# Patient Record
Sex: MALE | Race: BLACK OR AFRICAN AMERICAN | URBAN - METROPOLITAN AREA
[De-identification: names, ages, dates, MRNs, and addresses within clinical notes are randomized per-mention and may not be internally consistent; named-entity substitution may affect disease eponyms.]

---

## 2018-11-23 ENCOUNTER — APPOINTMENT (OUTPATIENT)
Dept: CT IMAGING | Age: 63
End: 2018-11-23
Attending: EMERGENCY MEDICINE
Payer: MEDICARE

## 2018-11-23 ENCOUNTER — HOSPITAL ENCOUNTER (EMERGENCY)
Age: 63
Discharge: HOME | End: 2018-11-23
Attending: EMERGENCY MEDICINE
Payer: MEDICARE

## 2018-11-23 VITALS
HEIGHT: 77 IN | HEART RATE: 60 BPM | TEMPERATURE: 98.1 F | BODY MASS INDEX: 27.75 KG/M2 | SYSTOLIC BLOOD PRESSURE: 145 MMHG | WEIGHT: 235 LBS | DIASTOLIC BLOOD PRESSURE: 74 MMHG | RESPIRATION RATE: 13 BRPM | OXYGEN SATURATION: 100 %

## 2018-11-23 DIAGNOSIS — H53.2 DIPLOPIA: ICD-10-CM

## 2018-11-23 DIAGNOSIS — S00.83XA FACIAL CONTUSION, INITIAL ENCOUNTER: Primary | ICD-10-CM

## 2018-11-23 LAB
ALBUMIN SERPL-MCNC: 3.6 G/DL (ref 3.4–5)
ALBUMIN/GLOB SERPL: 0.9 {RATIO} (ref 0.8–1.7)
ALP SERPL-CCNC: 127 U/L (ref 45–117)
ALT SERPL-CCNC: 16 U/L (ref 16–61)
ANION GAP SERPL CALC-SCNC: 5 MMOL/L (ref 3–18)
APTT PPP: 34.1 SEC (ref 23–36.4)
AST SERPL-CCNC: 21 U/L (ref 15–37)
BASOPHILS # BLD: 0 K/UL (ref 0–0.1)
BASOPHILS NFR BLD: 0 % (ref 0–2)
BILIRUB SERPL-MCNC: 0.3 MG/DL (ref 0.2–1)
BUN SERPL-MCNC: 9 MG/DL (ref 7–18)
BUN/CREAT SERPL: 10 (ref 12–20)
CALCIUM SERPL-MCNC: 9.4 MG/DL (ref 8.5–10.1)
CHLORIDE SERPL-SCNC: 106 MMOL/L (ref 100–108)
CO2 SERPL-SCNC: 29 MMOL/L (ref 21–32)
CREAT SERPL-MCNC: 0.93 MG/DL (ref 0.6–1.3)
DIFFERENTIAL METHOD BLD: ABNORMAL
EOSINOPHIL # BLD: 0.1 K/UL (ref 0–0.4)
EOSINOPHIL NFR BLD: 1 % (ref 0–5)
ERYTHROCYTE [DISTWIDTH] IN BLOOD BY AUTOMATED COUNT: 20.6 % (ref 11.6–14.5)
GLOBULIN SER CALC-MCNC: 4.1 G/DL (ref 2–4)
GLUCOSE SERPL-MCNC: 162 MG/DL (ref 74–99)
HCT VFR BLD AUTO: 36.4 % (ref 36–48)
HGB BLD-MCNC: 10.9 G/DL (ref 13–16)
INR PPP: 1 (ref 0.8–1.2)
LYMPHOCYTES # BLD: 2.4 K/UL (ref 0.9–3.6)
LYMPHOCYTES NFR BLD: 32 % (ref 21–52)
MCH RBC QN AUTO: 22.9 PG (ref 24–34)
MCHC RBC AUTO-ENTMCNC: 29.9 G/DL (ref 31–37)
MCV RBC AUTO: 76.3 FL (ref 74–97)
MONOCYTES # BLD: 0.6 K/UL (ref 0.05–1.2)
MONOCYTES NFR BLD: 8 % (ref 3–10)
NEUTS SEG # BLD: 4.4 K/UL (ref 1.8–8)
NEUTS SEG NFR BLD: 59 % (ref 40–73)
PLATELET # BLD AUTO: 438 K/UL (ref 135–420)
PMV BLD AUTO: 9.6 FL (ref 9.2–11.8)
POTASSIUM SERPL-SCNC: 5 MMOL/L (ref 3.5–5.5)
PROT SERPL-MCNC: 7.7 G/DL (ref 6.4–8.2)
PROTHROMBIN TIME: 12.6 SEC (ref 11.5–15.2)
RBC # BLD AUTO: 4.77 M/UL (ref 4.7–5.5)
SODIUM SERPL-SCNC: 140 MMOL/L (ref 136–145)
WBC # BLD AUTO: 7.5 K/UL (ref 4.6–13.2)

## 2018-11-23 PROCEDURE — 85025 COMPLETE CBC W/AUTO DIFF WBC: CPT

## 2018-11-23 PROCEDURE — 80053 COMPREHEN METABOLIC PANEL: CPT

## 2018-11-23 PROCEDURE — 70450 CT HEAD/BRAIN W/O DYE: CPT

## 2018-11-23 PROCEDURE — 93005 ELECTROCARDIOGRAM TRACING: CPT

## 2018-11-23 PROCEDURE — 85730 THROMBOPLASTIN TIME PARTIAL: CPT

## 2018-11-23 PROCEDURE — 85610 PROTHROMBIN TIME: CPT

## 2018-11-23 PROCEDURE — 70486 CT MAXILLOFACIAL W/O DYE: CPT

## 2018-11-23 PROCEDURE — 99285 EMERGENCY DEPT VISIT HI MDM: CPT

## 2018-11-23 NOTE — ED TRIAGE NOTES
Patient states on 11/17 he was in his tub, became dizzy, fell while getting out of the tub, no LOC, states he became \"real whoozy\", states he has had a headache since this fall, states he is still dizzy, double vision after the fall

## 2018-11-23 NOTE — DISCHARGE INSTRUCTIONS
Double Vision: Care Instructions  Your Care Instructions  Double vision means seeing two images instead of one. To see normally, your eyes, the muscles that move them, the nerves that send images to your brain, and your brain all have to work together. A problem with any of these parts can cause double vision. Double vision can occur in one or both eyes. It can be horizontal (so the images appear side by side) or vertical (so one image appears above the other). Double vision may be caused by a muscle or nerve problem. Or it may be caused by an eye problem such as a cataract or by a brain problem such as a stroke. When the cause is found, double vision can usually be corrected. To find the cause, you may need tests. These may include blood tests and imaging tests such as MRI. You may need to follow up with an eye doctor or a brain specialist (neurologist) for more testing or treatment. The doctor has checked you carefully, but problems can develop later. If you notice any problems or new symptoms, get medical treatment right away. Follow-up care is a key part of your treatment and safety. Be sure to make and go to all appointments, and call your doctor if you are having problems. It's also a good idea to know your test results and keep a list of the medicines you take. How can you care for yourself at home? · Do not drive or do other things that could be dangerous because of your double vision. · Make your home safe to help prevent injuries. For example, remove throw rugs and electrical cords that could cause falls. Be extra careful when you work with sharp tools or knives. · Ask another adult to stay with you until your vision gets better. When should you call for help? Call 911 anytime you think you may need emergency care. For example, call if:  · You have symptoms of a stroke.  These may include:  ¨ Sudden numbness, tingling, weakness, or loss of movement in your face, arm, or leg, especially on only one side of your body. ¨ Sudden vision changes. ¨ Sudden trouble speaking. ¨ Sudden confusion or trouble understanding simple statements. ¨ Sudden problems with walking or balance. ¨ A sudden, severe headache that is different from past headaches. Call your doctor now or seek immediate medical care if:  · You have new or worse eye pain. · You have new or worse redness in your eye. · Light hurts your eye. Watch closely for changes in your health, and be sure to contact your doctor if:  · You do not get better as expected. Where can you learn more? Go to DoubleUp.be  Enter S401 in the search box to learn more about \"Double Vision: Care Instructions. \"   © 9239-4101 Healthwise, [employer]. Care instructions adapted under license by [employer] (which disclaims liability or warranty for this information). This care instruction is for use with your licensed healthcare professional. If you have questions about a medical condition or this instruction, always ask your healthcare professional. [place] [address] any warranty or liability for your use of this information. Content Version: 80.6.209700; Current as of: August 21, 2015             Head Injury: Care Instructions  Your Care Instructions    Most injuries to the head are minor. Bumps, cuts, and scrapes on the head and face usually heal well and can be treated the same as injuries to other parts of the body. Although it's rare, once in a while a more serious problem shows up after you are home. So it's good to be on the lookout for symptoms for a day or two. Follow-up care is a key part of your treatment and safety. Be sure to make and go to all appointments, and call your doctor if you are having problems. It's also a good idea to know your test results and keep a list of the medicines you take. How can you care for yourself at home? · Follow your doctor's instructions.  He or she will tell you if you need someone to watch you closely for the next 24 hours or longer. · Take it easy for the next few days or more if you are not feeling well. · Ask your doctor when it's okay for you to go back to activities like driving a car, riding a bike, or operating machinery. When should you call for help? Call 911 anytime you think you may need emergency care. For example, call if:    · You have a seizure.     · You passed out (lost consciousness).     · You are confused or can't stay awake.    Call your doctor now or seek immediate medical care if:    · You have new or worse vomiting.     · You feel less alert.     · You have new weakness or numbness in any part of your body.    Watch closely for changes in your health, and be sure to contact your doctor if:    · You do not get better as expected.     · You have new symptoms, such as headaches, trouble concentrating, or changes in mood. Where can you learn more? Go to http://loyd-kalee.info/. Enter F615 in the search box to learn more about \"Head Injury: Care Instructions. \"  Current as of: June 4, 2018  Content Version: 11.8  © 5182-7902 Healthwise, Inc[employer]. Care instructions adapted under license by [employer] (which disclaims liability or warranty for this information). If you have questions about a medical condition or this instruction, always ask your healthcare professional. [address] any warranty or liability for your use of this information.

## 2018-11-23 NOTE — ED PROVIDER NOTES
EMERGENCY DEPARTMENT HISTORY AND PHYSICAL EXAM 
 
2:02 PM 
 
 
Date: 11/23/2018 Patient Name: Simi Tucker History of Presenting Illness Chief Complaint Patient presents with  Fall  Headache  Hypertension  Double Vision History Provided By: Patient Chief Complaint: Headache Duration:  Today Timing:  Acute Location: Right-sided head Quality: N/A Severity: 5/10 Modifying Factors: Reports recent fall on 11/17/18 Associated Symptoms: Also reports dizziness, and double vision. Denies other associated symptoms. Additional History (Context): Simi Tucker is a 66 y.o. male presents withacute right-sided headache onset today. Patient states on 11/17, while in a standing position in the tub, he experienced dizziness, and fell while getting out of the tub. Reports hitting his head and right eye. Denies LOC. Reports waking up today with a right-sided headache, double vision, and dizziness. Denies other associated symptoms. Reports he was recommended by his home health aide to come to the ED for an evaluation. Also reports concern for his HTN. Notes hx of HTN and DM. Reports compliance with his medications for DM. Notes his sugars have been running \"so-so. \" No other concerns were expressed at this time. PCP: Sandra Doshi MD 
 
 
 
Past History Past Medical History: 
Past Medical History:  
Diagnosis Date  Cancer [facility])   
 prostate  Diabetes ([facility] [address]  Hypertension  Ill-defined condition   
 back and neck pain Past Surgical History: 
Past Surgical History:  
Procedure Laterality Date  HX ORTHOPAEDIC    
 back, knees Family History: 
History reviewed. No pertinent family history. Social History: 
Social History Tobacco Use  Smoking status: Current Every Day Smoker Packs/day: 1.00  Smokeless tobacco: Never Used Substance Use Topics  Alcohol use: No  
  Frequency: Never  Drug use: No  
 
 
Allergies: No Known Allergies Review of Systems Review of Systems Constitutional: Negative for diaphoresis and fever. HENT: Negative for congestion and sore throat. Eyes: Positive for visual disturbance (Double vision ). Negative for pain and itching. Respiratory: Negative for cough and shortness of breath. Cardiovascular: Negative for chest pain and palpitations. Gastrointestinal: Negative for abdominal pain and diarrhea. Endocrine: Negative for polydipsia and polyuria. Genitourinary: Negative for dysuria and hematuria. Musculoskeletal: Negative for arthralgias and myalgias. Skin: Negative for rash and wound. Neurological: Positive for dizziness and headaches. Negative for seizures and syncope. Hematological: Does not bruise/bleed easily. Psychiatric/Behavioral: Negative for agitation and hallucinations. Physical Exam  
 
Patient Vitals for the past 12 hrs: 
 Temp Pulse Resp BP SpO2  
11/23/18 1234 98.1 °F (36.7 °C) 65 16 154/83 100 % Physical Exam  
Constitutional: He appears well-developed and well-nourished. HENT:  
Head: Normocephalic and atraumatic. Eyes: Conjunctivae are normal. No scleral icterus. Neck: Normal range of motion. Neck supple. No JVD present. Cardiovascular: Normal rate, regular rhythm and normal heart sounds. 4 intact extremity pulses Pulmonary/Chest: Effort normal and breath sounds normal.  
Abdominal: Soft. He exhibits no mass. There is no tenderness. Musculoskeletal: Normal range of motion. Lymphadenopathy:  
  He has no cervical adenopathy. Neurological: He is alert. He has normal strength. No cranial nerve deficit or sensory deficit. Coordination normal.  
Skin: Skin is warm and dry. Nursing note and vitals reviewed. Diagnostic Study Results Labs - Recent Results (from the past 12 hour(s)) CBC WITH AUTOMATED DIFF Collection Time: 11/23/18  1:30 PM  
Result Value Ref Range WBC 7.5 4.6 - 13.2 K/uL RBC 4.77 4.70 - 5.50 M/uL  
 HGB 10.9 (L) 13.0 - 16.0 g/dL HCT 36.4 36.0 - 48.0 % MCV 76.3 74.0 - 97.0 FL  
 MCH 22.9 (L) 24.0 - 34.0 PG  
 MCHC 29.9 (L) 31.0 - 37.0 g/dL RDW 20.6 (H) 11.6 - 14.5 % PLATELET 471 (H) 329 - 420 K/uL MPV 9.6 9.2 - 11.8 FL  
 NEUTROPHILS 59 40 - 73 % LYMPHOCYTES 32 21 - 52 % MONOCYTES 8 3 - 10 % EOSINOPHILS 1 0 - 5 % BASOPHILS 0 0 - 2 %  
 ABS. NEUTROPHILS 4.4 1.8 - 8.0 K/UL  
 ABS. LYMPHOCYTES 2.4 0.9 - 3.6 K/UL  
 ABS. MONOCYTES 0.6 0.05 - 1.2 K/UL  
 ABS. EOSINOPHILS 0.1 0.0 - 0.4 K/UL  
 ABS. BASOPHILS 0.0 0.0 - 0.1 K/UL  
 DF AUTOMATED METABOLIC PANEL, COMPREHENSIVE Collection Time: 11/23/18  1:30 PM  
Result Value Ref Range Sodium 140 136 - 145 mmol/L Potassium 5.0 3.5 - 5.5 mmol/L Chloride 106 100 - 108 mmol/L  
 CO2 29 21 - 32 mmol/L Anion gap 5 3.0 - 18 mmol/L Glucose 162 (H) 74 - 99 mg/dL BUN 9 7.0 - 18 MG/DL Creatinine 0.93 0.6 - 1.3 MG/DL  
 BUN/Creatinine ratio 10 (L) 12 - 20 GFR est AA >60 >60 ml/min/1.73m2 GFR est non-AA >60 >60 ml/min/1.73m2 Calcium 9.4 8.5 - 10.1 MG/DL Bilirubin, total 0.3 0.2 - 1.0 MG/DL  
 ALT (SGPT) 16 16 - 61 U/L  
 AST (SGOT) 21 15 - 37 U/L Alk. phosphatase 127 (H) 45 - 117 U/L Protein, total 7.7 6.4 - 8.2 g/dL Albumin 3.6 3.4 - 5.0 g/dL Globulin 4.1 (H) 2.0 - 4.0 g/dL A-G Ratio 0.9 0.8 - 1.7 PROTHROMBIN TIME + INR Collection Time: 11/23/18  1:30 PM  
Result Value Ref Range Prothrombin time 12.6 11.5 - 15.2 sec INR 1.0 0.8 - 1.2 PTT Collection Time: 11/23/18  1:30 PM  
Result Value Ref Range aPTT 34.1 23.0 - 36.4 SEC  
EKG, 12 LEAD, INITIAL Collection Time: 11/23/18  2:55 PM  
Result Value Ref Range Ventricular Rate 55 BPM  
 Atrial Rate 55 BPM  
 P-R Interval 152 ms QRS Duration 88 ms Q-T Interval 446 ms  
 QTC Calculation (Bezet) 426 ms Calculated P Axis 40 degrees Calculated R Axis -18 degrees Calculated T Axis -117 degrees Diagnosis Sinus bradycardia Left ventricular hypertrophy with repolarization abnormality Abnormal ECG When compared with ECG of 05-NOV-2011 00:21, 
Vent. rate has decreased BY  95 BPM 
ST now depressed in Inferior leads Non-specific change in ST segment in Anterior leads T wave inversion now evident in Inferior leads T wave inversion now evident in Anterior leads Radiologic Studies -  
CT MAXILLOFACIAL WO CONT Final Result CT HEAD WO CONT Final Result Ct Head Wo Cont Result Date: 11/23/2018 EXAM: CT HEAD, WITHOUT IV CONTRAST COMPARISON: None INDICATION: Prior fall in bathtub 11/17/2018 with persistent dizziness and double vision TECHNIQUE: Multiple axial CT images of the head were obtained extending from the skull base through the vertex. Additional coronal and sagittal reformations were also performed. One or more dose reduction techniques were used on this CT: automated exposure control, adjustment of the mAs and/or kVp according to patient size, and iterative reconstruction techniques. The specific techniques used on this CT exam have been documented in the patient's electronic medical record. _______________ FINDINGS: BRAIN AND POSTERIOR FOSSA: There is generalized prominence of the ventricular system, associated with proportional widening of the cortical cerebral sulci, compatible with generalized volume loss. Hazy hypoattenuation identified along the periventricular white matter, a nonspecific finding which is most commonly encountered in the setting of chronic microvascular disease. There is no intracranial hemorrhage, mass effect, or midline shift. There are no significant additional areas of abnormal parenchymal attenuation. EXTRA-AXIAL SPACES AND MENINGES: There are no abnormal extra-axial fluid collections. CALVARIUM: No acute osseous abnormality.  OTHER: The visualized portions of the paranasal sinuses and mastoid air cells are clear. _______________ IMPRESSION: 1. No CT evidence of an acute intracranial abnormality or other findings related to recent trauma. 2.  Mild cerebral atrophy/volume loss and periventricular white matter changes, most commonly seen with chronic microvascular disease. Ct Maxillofacial Wo Cont Result Date: 11/23/2018 EXAM: CT MAXILLOFACIAL WO CONT CLINICAL INDICATION/HISTORY: Facial fracture(s). -Additional: None COMPARISON: None. TECHNIQUE: Axial CT imaging of the maxillofacial bones was performed without intravenous contrast. Sagittal and coronal reconstructions are provided. One or more dose reduction techniques were used on this CT: automated exposure control, adjustment of the mAs and/or kVp according to patient size, and iterative reconstruction techniques. The specific techniques used on this CT exam have been documented in the patient's electronic medical record. _______________ FINDINGS: Facial bones: No acute facial bone fractures. There is mild expansion with groundglass and internal lucency in the left frontal bone along the superior lateral orbital rim, compatible with fibrous dysplasia. There is a a second smaller focus in the right frontal sinus. Scattered streak artifact from metallic dental work obscures the oral cavity. Normal alignment of the temporomandibular joints. Calvarium: Intact. Soft tissues: Unremarkable. Sinuses: There are several small polypoid lesions in the maxillary sinuses bilaterally, likely mucus retention cysts. Mild mucosal thickening in the right sphenoid sinus. Sinuses are otherwise clear. Included brain parenchyma: Unremarkable. Other: Severe degenerative changes in the cervical spine are partially visualized including solid fusion across C4-C5, C5-C6. _______________ IMPRESSION: 1. No acute facial bone fractures.  2. Benign appearing osseous lesion in the left frontal bone and right frontal sinus, most compatible with fibrous dysplasia. 3. Mild paranasal sinus disease. Medications ordered:  
Medications - No data to display Medical Decision Making Initial Medical Decision Making and DDx: 
Double vision is concerning for an intracranial lesion. ED Course: Progress Notes, Reevaluation, and Consults: 
  
2:59 PM 
I spoke with Dr. Blaze Gee, Teleneurology. Will come and evaluate the patient. 3:09 PM 
I spoke with Dr. Blaze Gee (Teleneurology). He reports possible traumatic contusion of the nerve, causing his diplopia. Not consistent with a stroke. Recommends the patient follow-up with Ophthalmology. I am the first provider for this patient. I reviewed the vital signs, available nursing notes, past medical history, past surgical history, family history and social history. Vital Signs-Reviewed the patient's vital signs. Pulse Oximetry Analysis - 100% on Room Air Cardiac Monitor: 
Rate/Rhythm: 65bpm/ Normal Sinus Rhythm EKG: Interpreted by the EP. Time Interpreted: 2:55PM 
 Rate: 55bpm 
 Rhythm: Sinus Bradycardia Interpretation: Deep T-wave inversions. Patient stated he has a hx of an abnormal EKG and has no chest pain. Records Reviewed: Nursing Notes and Triage notes  (Time of Review: 2:02 PM) 
 
3:27 PM Pt reevaluated at this time. Discussed results and findings, as well as, diagnosis and plan for discharge. Follow up with doctors/services listed. Return to the emergency department for alarming symptoms. Pt verbalizes understanding and agreement with plan. All questions addressed. Diagnosis Clinical Impression: 1. Facial contusion, initial encounter 2. Diplopia Disposition: Discharged Follow-up Information Follow up With Specialties Details Why Contact Info Miya Jaeger., MD Ophthalmology In 3 days  [address] [id number] [phone] Medication List  
  
 You have not been prescribed any medications. _______________________________ Attestations: 
Scribe Attestation Oc Alicea acting as a scribe for and in the presence of Wilmer Morelos MD     
November 23, 2018 at 2:02 PM 
    
Provider Attestation:     
I personally performed the services described in the documentation, reviewed the documentation, as recorded by the scribe in my presence, and it accurately and completely records my words and actions. November 23, 2018 at 2:02 PM - Wilmer Morelos MD   
_______________________________

## 2018-11-26 LAB
ATRIAL RATE: 55 BPM
CALCULATED P AXIS, ECG09: 40 DEGREES
CALCULATED R AXIS, ECG10: -18 DEGREES
CALCULATED T AXIS, ECG11: -117 DEGREES
DIAGNOSIS, 93000: NORMAL
P-R INTERVAL, ECG05: 152 MS
Q-T INTERVAL, ECG07: 446 MS
QRS DURATION, ECG06: 88 MS
QTC CALCULATION (BEZET), ECG08: 426 MS
VENTRICULAR RATE, ECG03: 55 BPM

## 2020-02-11 ENCOUNTER — OFFICE VISIT (OUTPATIENT)
Dept: FAMILY MEDICINE CLINIC | Facility: CLINIC | Age: 65
End: 2020-02-11

## 2020-02-11 VITALS
BODY MASS INDEX: 25.39 KG/M2 | SYSTOLIC BLOOD PRESSURE: 138 MMHG | HEART RATE: 82 BPM | OXYGEN SATURATION: 100 % | HEIGHT: 77 IN | TEMPERATURE: 95.9 F | WEIGHT: 215 LBS | DIASTOLIC BLOOD PRESSURE: 70 MMHG | RESPIRATION RATE: 16 BRPM

## 2020-02-11 DIAGNOSIS — Z11.59 NEED FOR HEPATITIS C SCREENING TEST: ICD-10-CM

## 2020-02-11 DIAGNOSIS — D50.9 MICROCYTIC ANEMIA: ICD-10-CM

## 2020-02-11 DIAGNOSIS — Z23 ENCOUNTER FOR IMMUNIZATION: ICD-10-CM

## 2020-02-11 DIAGNOSIS — Z12.5 SCREENING FOR PROSTATE CANCER: ICD-10-CM

## 2020-02-11 DIAGNOSIS — F17.210 CIGARETTE NICOTINE DEPENDENCE WITHOUT COMPLICATION: Primary | ICD-10-CM

## 2020-02-11 DIAGNOSIS — Z13.31 SCREENING FOR DEPRESSION: ICD-10-CM

## 2020-02-11 DIAGNOSIS — E66.3 OVERWEIGHT (BMI 25.0-29.9): ICD-10-CM

## 2020-02-11 NOTE — PROGRESS NOTES
Derrick Sharma is a 63 y.o.  male presents today for office visit for establish care. Pt would also like to discuss DM. Pt is not fasting. Pt is in Room # 3      1. Have you been to the ER, urgent care clinic since your last visit? Hospitalized since your last visit? No    2. Have you seen or consulted any other health care providers outside of the [address] [place] [address] since your last visit? Include any pap smears or colon screening. No    Upcoming Appts  none    Health Maintenance reviewed       VORB: No orders of the defined types were placed in this encounter.   Nicolette Trimble LPN

## 2020-02-11 NOTE — PROGRESS NOTES
History and Physical    Today's Date:  2020   Patient's Name: Ovidio Carter   Patient's :  1955     History:     Chief Complaint   Patient presents with    [facility]    Diabetes     BS:    Nicotine Dependence     Ovidio Carter is a 63 y.o. male presenting for initial visit to [facility]. Will obtain records from previous provider to review. Care team updated on connect care. Smoker   This is a chronic problem, new to me. This is not at goal. Pt smokes 1/2 ppd. Pt is open to quitting. Overweight  This is a chronic problem, new to me. This is not at goal.  Pt tries to eat a healthy diet. Pt lost weight since the last weight in his second Epic chart. Diabetes mellitus  This is a chronic problem, new to me. Current status is unknown. Pt is on metformin. Pt is compliant with this medication. 3 most recent PHQ Screens 2020   Little interest or pleasure in doing things Not at all   Feeling down, depressed, irritable, or hopeless Not at all   Total Score PHQ 2 0      Past Medical History:   Diagnosis Date    Cigarette nicotine dependence without complication 8029    Diabetes ([address]     History of prostate cancer     Overweight (BMI 25.0-29.9) 2020     Past Surgical History:   Procedure Laterality Date    HX HAMMER TOE REPAIR      HX PROSTATE SURGERY        reports that he has been smoking. He has never used smokeless tobacco. He reports previous alcohol use. He reports that he does not use drugs. History reviewed. No pertinent family history. No Known Allergies  Problem List:      Patient Active Problem List   Diagnosis Code    Cigarette nicotine dependence without complication S77.369    Overweight (BMI 25.0-29. 9) E66.3     Medications:     Current Outpatient Medications   Medication Sig    pneumococcal 23-valent (PNEUMOVAX 23) 25 mcg/0.5 mL injection 0.5 mL by IntraMUSCular route once for 1 dose.     varicella-zoster recombinant, PF, (SHINGRIX, PF,) 50 mcg/0.5 mL susr injection 0.5 mL by IntraMUSCular route once for 1 dose. No current facility-administered medications for this visit. Review of Systems:   General:   fevers, chills  Neurologic: dizziness, lightheadedness  Eyes:  vision changes, double vision, photophobia  Ears:  change in hearing, ear pain, ear discharge, ear ringing  Nose:  sneezing, runny nose  Mouth/Throat: sore throat, voice change  Neck:  pain, +stiffness  Respiratory: dyspnea at rest, dyspnea on exertion, wheezing, cough, sputum production  Cardiovascular:   chest pain, palpitations  Gastrointestinal:  nausea, vomiting  Urinary: dysuria, urinary frequency, nocturia, malodorous urine, difficulty initiating flow, slow urine stream  Genital (M): penile discharge, ulcerations, rashes  Musculoskeletal:  joint pain, +back pain  Psychiatric: +insomnia, anxiety  Endocrine: cold intolerance, heat intolerance  Hematologic: easy bruising, easy bleeding  Dermatologic: Itching, rash    Physical Assessment:     Visit Vitals  /70 (BP 1 Location: Left arm, BP Patient Position: Sitting) Comment (BP 1 Location): manual   Pulse 82   Temp 95.9 °F (35.5 °C) (Oral)   Resp 16   Ht 6' 5\" (1.956 m)   Wt 215 lb (97.5 kg)   SpO2 100%   BMI 25.50 kg/m²     General:   Well-groomed, well-nourished, in no distress, pleasant, appropriate and conversant. Eyes:    PERRL, conjunctiva clear  Mouth:  MMM, good dentition, oropharynx WNL without membranes, exudates, petechiae or ulcers  Cardiovascular:   RRR, no MRG. Pulmonary:   Lungs clear bilaterally. Normal respiratory effort. Abdomen:   Abdomen soft, NT, ND  Extremities:   No edema, LEs warm and well-perfused. Neuro:   Alert and oriented, no focal deficits. No facial asymmetry noted. Skin:    No rash or jaundice  MSK:   Normal ROM, 5/5 muscle strength  Psych:  No pressured speech or abnormal thought content    Assessment/Plan & Orders:         ICD-10-CM ICD-9-CM    1.  Cigarette nicotine dependence without complication V38.438 577.4    2. Overweight (BMI 25.0-29. 9) E66.3 278.02 CBC WITH AUTOMATED DIFF      LIPID PANEL      METABOLIC PANEL, COMPREHENSIVE      HEMOGLOBIN A1C WITH EAG      TSH AND FREE T4   3. Screening for prostate cancer Z12.5 V76.44 PSA, DIAGNOSTIC (PROSTATE SPECIFIC AG)   4. Screening for depression Z13.31 V79.0 NE PATIENT SCREENED FOR DEPRESSION   5. Encounter for immunization Z23 V03.89 pneumococcal 23-valent (PNEUMOVAX 23) 25 mcg/0.5 mL injection      varicella-zoster recombinant, PF, (SHINGRIX, PF,) 50 mcg/0.5 mL susr injection      INFLUENZA VACCINE INACTIVATED (IIV), SUBUNIT, ADJUVANTED, IM   6. Need for hepatitis C screening test Z11.59 V73.89 HEPATITIS C AB     HM  Colon cancer: Colonoscopy done, did at the [place]  Influenza vaccine: due  Pneumococcal vaccine: due, indication is smoking  Tdap: complete  Herpes Zoster vaccine: due at age 61  Hep B vaccine: not indicated (liver dz, DM 19-59)  Weight:  Body mass index is 25.5 kg/m². Discussed the patient's BMI with him. The BMI follow up plan is as follows: diet and exercise  AAA:  One-time abdominal US if current or former smoker aged 72 to 76 years   Osteoporosis:  No indication for dexa scan       Follow-up and Dispositions    · Return in about 1 day (around 2/12/2020) for Nurse visit. *Patient verbalized understanding and agreement with the plan. Patient was given an after-visit summary. Nava Smith.  [address], MD - Internal Medicine  2/11/2020, 8:57 AM  [facility]  [address] [place], [address]  Phone [phone]  Fax [phone]

## 2020-02-11 NOTE — PROGRESS NOTES
Danilo Dixon is a 63 y.o. male who presents for routine immunizations. He denies any symptoms , reactions or allergies that would exclude them from being immunized today. Risks and adverse reactions were discussed and the VIS was given to them. All questions were addressed. He was observed for 10 min post injection. There were no reactions observed.     Domo Mann LPN

## 2020-02-12 ENCOUNTER — HOSPITAL ENCOUNTER (OUTPATIENT)
Dept: LAB | Age: 65
Discharge: HOME | End: 2020-02-12
Payer: MEDICARE

## 2020-02-12 DIAGNOSIS — E66.3 OVERWEIGHT (BMI 25.0-29.9): ICD-10-CM

## 2020-02-12 DIAGNOSIS — Z11.59 NEED FOR HEPATITIS C SCREENING TEST: ICD-10-CM

## 2020-02-12 DIAGNOSIS — Z12.5 SCREENING FOR PROSTATE CANCER: ICD-10-CM

## 2020-02-12 LAB
ALBUMIN SERPL-MCNC: 3.7 G/DL (ref 3.4–5)
ALBUMIN/GLOB SERPL: 0.9 {RATIO} (ref 0.8–1.7)
ALP SERPL-CCNC: 115 U/L (ref 45–117)
ALT SERPL-CCNC: 11 U/L (ref 16–61)
ANION GAP SERPL CALC-SCNC: 4 MMOL/L (ref 3–18)
AST SERPL-CCNC: 10 U/L (ref 10–38)
BASOPHILS # BLD: 0 K/UL (ref 0–0.1)
BASOPHILS NFR BLD: 0 % (ref 0–2)
BILIRUB SERPL-MCNC: 0.4 MG/DL (ref 0.2–1)
BUN SERPL-MCNC: 18 MG/DL (ref 7–18)
BUN/CREAT SERPL: 20 (ref 12–20)
CALCIUM SERPL-MCNC: 9.4 MG/DL (ref 8.5–10.1)
CHLORIDE SERPL-SCNC: 107 MMOL/L (ref 100–111)
CHOLEST SERPL-MCNC: 145 MG/DL
CO2 SERPL-SCNC: 29 MMOL/L (ref 21–32)
CREAT SERPL-MCNC: 0.91 MG/DL (ref 0.6–1.3)
DIFFERENTIAL METHOD BLD: ABNORMAL
EOSINOPHIL # BLD: 0.3 K/UL (ref 0–0.4)
EOSINOPHIL NFR BLD: 3 % (ref 0–5)
ERYTHROCYTE [DISTWIDTH] IN BLOOD BY AUTOMATED COUNT: 20.9 % (ref 11.6–14.5)
EST. AVERAGE GLUCOSE BLD GHB EST-MCNC: 128 MG/DL
GLOBULIN SER CALC-MCNC: 3.9 G/DL (ref 2–4)
GLUCOSE SERPL-MCNC: 114 MG/DL (ref 74–99)
HBA1C MFR BLD: 6.1 % (ref 4.2–5.6)
HCT VFR BLD AUTO: 30.7 % (ref 36–48)
HDLC SERPL-MCNC: 52 MG/DL (ref 40–60)
HDLC SERPL: 2.8 {RATIO} (ref 0–5)
HGB BLD-MCNC: 9.1 G/DL (ref 13–16)
LDLC SERPL CALC-MCNC: 78.8 MG/DL (ref 0–100)
LIPID PROFILE,FLP: NORMAL
LYMPHOCYTES # BLD: 2.3 K/UL (ref 0.9–3.6)
LYMPHOCYTES NFR BLD: 28 % (ref 21–52)
MCH RBC QN AUTO: 20.4 PG (ref 24–34)
MCHC RBC AUTO-ENTMCNC: 29.6 G/DL (ref 31–37)
MCV RBC AUTO: 68.7 FL (ref 74–97)
MONOCYTES # BLD: 0.9 K/UL (ref 0.05–1.2)
MONOCYTES NFR BLD: 11 % (ref 3–10)
NEUTS SEG # BLD: 4.8 K/UL (ref 1.8–8)
NEUTS SEG NFR BLD: 58 % (ref 40–73)
PLATELET # BLD AUTO: 458 K/UL (ref 135–420)
PLATELET COMMENTS,PCOM: ABNORMAL
PMV BLD AUTO: 10.5 FL (ref 9.2–11.8)
POTASSIUM SERPL-SCNC: 4.3 MMOL/L (ref 3.5–5.5)
PROT SERPL-MCNC: 7.6 G/DL (ref 6.4–8.2)
PSA SERPL-MCNC: 0 NG/ML (ref 0–4)
RBC # BLD AUTO: 4.47 M/UL (ref 4.7–5.5)
RBC MORPH BLD: ABNORMAL
SODIUM SERPL-SCNC: 140 MMOL/L (ref 136–145)
T4 FREE SERPL-MCNC: 1.1 NG/DL (ref 0.7–1.5)
TRIGL SERPL-MCNC: 71 MG/DL (ref ?–150)
TSH SERPL DL<=0.05 MIU/L-ACNC: 1.33 UIU/ML (ref 0.36–3.74)
VLDLC SERPL CALC-MCNC: 14.2 MG/DL
WBC # BLD AUTO: 8.3 K/UL (ref 4.6–13.2)

## 2020-02-12 PROCEDURE — 36415 COLL VENOUS BLD VENIPUNCTURE: CPT

## 2020-02-12 PROCEDURE — 84439 ASSAY OF FREE THYROXINE: CPT

## 2020-02-12 PROCEDURE — 80061 LIPID PANEL: CPT

## 2020-02-12 PROCEDURE — 82607 VITAMIN B-12: CPT

## 2020-02-12 PROCEDURE — 83540 ASSAY OF IRON: CPT

## 2020-02-12 PROCEDURE — 85025 COMPLETE CBC W/AUTO DIFF WBC: CPT

## 2020-02-12 PROCEDURE — 80053 COMPREHEN METABOLIC PANEL: CPT

## 2020-02-12 PROCEDURE — 83036 HEMOGLOBIN GLYCOSYLATED A1C: CPT

## 2020-02-12 PROCEDURE — 84153 ASSAY OF PSA TOTAL: CPT

## 2020-02-12 PROCEDURE — 86803 HEPATITIS C AB TEST: CPT

## 2020-02-12 RX ORDER — METOPROLOL TARTRATE 50 MG/1
TABLET ORAL 2 TIMES DAILY
COMMUNITY

## 2020-02-12 RX ORDER — ATORVASTATIN CALCIUM 80 MG/1
40 TABLET, FILM COATED ORAL DAILY
COMMUNITY

## 2020-02-12 RX ORDER — SIMVASTATIN 80 MG/1
80 TABLET, FILM COATED ORAL
COMMUNITY

## 2020-02-12 RX ORDER — SENNOSIDES 25 MG/1
TABLET, FILM COATED ORAL
COMMUNITY

## 2020-02-12 RX ORDER — DOCUSATE SODIUM 100 MG/1
100 CAPSULE, LIQUID FILLED ORAL 2 TIMES DAILY
COMMUNITY

## 2020-02-12 RX ORDER — LISINOPRIL 20 MG/1
TABLET ORAL DAILY
COMMUNITY

## 2020-02-12 RX ORDER — BUPROPION HYDROCHLORIDE 150 MG/1
TABLET, EXTENDED RELEASE ORAL 3 TIMES DAILY
COMMUNITY

## 2020-02-12 RX ORDER — METFORMIN HYDROCHLORIDE 1000 MG/1
1000 TABLET ORAL DAILY
COMMUNITY

## 2020-02-12 RX ORDER — TRAMADOL HYDROCHLORIDE 50 MG/1
50 TABLET ORAL 3 TIMES DAILY
COMMUNITY

## 2020-02-12 RX ORDER — DILTIAZEM HYDROCHLORIDE EXTENDED-RELEASE TABLETS 240 MG/1
240 TABLET, EXTENDED RELEASE ORAL DAILY
COMMUNITY

## 2020-02-12 RX ORDER — ACYCLOVIR 200 MG/1
CAPSULE ORAL DAILY
COMMUNITY

## 2020-02-13 PROBLEM — D50.9 MICROCYTIC ANEMIA: Status: ACTIVE | Noted: 2020-02-13

## 2020-02-13 PROBLEM — R73.09 ELEVATED HEMOGLOBIN A1C: Status: ACTIVE | Noted: 2020-02-13

## 2020-02-13 LAB
HCV AB SER IA-ACNC: 0.07 INDEX
HCV AB SERPL QL IA: NEGATIVE
HCV COMMENT,HCGAC: NORMAL

## 2020-02-13 NOTE — PROGRESS NOTES
Result reviewed. LPN to call pt with results. Microcytic anemia noted. Additional labs ordered. LPN to find out if lab can add to previously drawn labs.  Pt also needs to also do a fit test. Recommend pt make a follow up appt for 1 month

## 2020-02-17 LAB
FOLATE SERPL-MCNC: 7.4 NG/ML (ref 3.1–17.5)
IRON SATN MFR SERPL: 5 % (ref 20–50)
IRON SERPL-MCNC: 18 UG/DL (ref 50–175)
TIBC SERPL-MCNC: 391 UG/DL (ref 250–450)
VIT B12 SERPL-MCNC: 515 PG/ML (ref 211–911)

## 2020-03-09 NOTE — PROGRESS NOTES
Result reviewed. LPN to call pt with results. Pt has iron deficiency anemia. Recommend a fit test or referral for a colonoscopy.

## 2020-11-25 ENCOUNTER — TELEPHONE (OUTPATIENT)
Dept: FAMILY MEDICINE CLINIC | Age: 65
End: 2020-11-25

## 2020-11-25 DIAGNOSIS — D50.9 IRON DEFICIENCY ANEMIA, UNSPECIFIED IRON DEFICIENCY ANEMIA TYPE: ICD-10-CM

## 2020-11-25 DIAGNOSIS — R73.09 ELEVATED HEMOGLOBIN A1C: Primary | ICD-10-CM

## 2020-11-25 NOTE — TELEPHONE ENCOUNTER
Please lab order.     Future Appointments   Date Time Provider Navi Serrato   12/10/2020  1:00 PM Servando Harrison MD [facility] BS AMB

## 2020-12-10 ENCOUNTER — VIRTUAL VISIT (OUTPATIENT)
Dept: FAMILY MEDICINE CLINIC | Age: 65
End: 2020-12-10
Payer: MEDICARE

## 2020-12-10 DIAGNOSIS — E66.3 OVERWEIGHT (BMI 25.0-29.9): ICD-10-CM

## 2020-12-10 DIAGNOSIS — Z71.89 ADVANCED CARE PLANNING/COUNSELING DISCUSSION: ICD-10-CM

## 2020-12-10 DIAGNOSIS — F17.210 CIGARETTE NICOTINE DEPENDENCE WITHOUT COMPLICATION: ICD-10-CM

## 2020-12-10 DIAGNOSIS — I10 ESSENTIAL HYPERTENSION: ICD-10-CM

## 2020-12-10 DIAGNOSIS — E11.9 CONTROLLED TYPE 2 DIABETES MELLITUS WITHOUT COMPLICATION, WITHOUT LONG-TERM CURRENT USE OF INSULIN (HCC): ICD-10-CM

## 2020-12-10 DIAGNOSIS — Z00.00 MEDICARE ANNUAL WELLNESS VISIT, INITIAL: Primary | ICD-10-CM

## 2020-12-10 PROBLEM — R73.09 ELEVATED HEMOGLOBIN A1C: Status: RESOLVED | Noted: 2020-02-13 | Resolved: 2020-12-10

## 2020-12-10 PROCEDURE — G8417 CALC BMI ABV UP PARAM F/U: HCPCS | Performed by: INTERNAL MEDICINE

## 2020-12-10 PROCEDURE — 99214 OFFICE O/P EST MOD 30 MIN: CPT | Performed by: INTERNAL MEDICINE

## 2020-12-10 PROCEDURE — 3044F HG A1C LEVEL LT 7.0%: CPT | Performed by: INTERNAL MEDICINE

## 2020-12-10 PROCEDURE — 99497 ADVNCD CARE PLAN 30 MIN: CPT | Performed by: INTERNAL MEDICINE

## 2020-12-10 PROCEDURE — G8427 DOCREV CUR MEDS BY ELIG CLIN: HCPCS | Performed by: INTERNAL MEDICINE

## 2020-12-10 PROCEDURE — 99406 BEHAV CHNG SMOKING 3-10 MIN: CPT | Performed by: INTERNAL MEDICINE

## 2020-12-10 PROCEDURE — 3017F COLORECTAL CA SCREEN DOC REV: CPT | Performed by: INTERNAL MEDICINE

## 2020-12-10 PROCEDURE — 2022F DILAT RTA XM EVC RTNOPTHY: CPT | Performed by: INTERNAL MEDICINE

## 2020-12-10 PROCEDURE — G8432 DEP SCR NOT DOC, RNG: HCPCS | Performed by: INTERNAL MEDICINE

## 2020-12-10 PROCEDURE — G0438 PPPS, INITIAL VISIT: HCPCS | Performed by: INTERNAL MEDICINE

## 2020-12-10 RX ORDER — ACYCLOVIR 200 MG/1
CAPSULE ORAL DAILY
Status: CANCELLED | OUTPATIENT
Start: 2020-12-10

## 2020-12-10 NOTE — PROGRESS NOTES
Humphrey Fontenot is a 63 y.o. male and presents for annual Medicare Wellness Visit. Problem List: Reviewed with patient and discussed risk factors. Patient Active Problem List   Diagnosis Code    Cigarette nicotine dependence without complication E35.653    Overweight (BMI 25.0-29. 9) E66.3    Microcytic anemia D50.9    Controlled type 2 diabetes mellitus without complication, without long-term current use of insulin (HCC) E11.9    Essential hypertension I10       Current medical providers:  Patient Care Team:  Elizabeth Sanchez MD as PCP - General (Internal Medicine)  Elizabeth Sanchez MD as PCP [facility] Provider  Camron Hughes MD (Family Medicine)    PSH: Reviewed with patient  Past Surgical History:   Procedure Laterality Date    HX HAMMER TOE REPAIR      HX ORTHOPAEDIC      back, knees    HX PROSTATE SURGERY          SH: Reviewed with patient  Social History     Tobacco Use    Smoking status: Heavy Tobacco Smoker     Packs/day: 0.50     Years: 42.00     Pack years: 21.00     Types: Cigarettes    Smokeless tobacco: Never Used   Substance Use Topics    Alcohol use: Not Currently     Frequency: Never    Drug use: Never       FH: Reviewed with patient  History reviewed. No pertinent family history. Medications/Allergies: Reviewed with patient  Current Outpatient Medications on File Prior to Visit   Medication Sig Dispense Refill    metFORMIN (GLUCOPHAGE) 1,000 mg tablet Take 1,000 mg by mouth daily.  traMADol (ULTRAM) 50 mg tablet Take 50 mg by mouth three (3) times daily.  metoprolol tartrate (LOPRESSOR) 50 mg tablet Take  by mouth two (2) times a day.  dilTIAZem ER (CARDIZEM LA) 240 mg Tb24 tablet Take 240 mg by mouth daily.  docusate sodium (COLACE) 100 mg capsule Take 100 mg by mouth two (2) times a day.  acyclovir (ZOVIRAX) 200 mg capsule Take  by mouth daily.  atorvastatin (LIPITOR) 80 mg tablet Take 40 mg by mouth daily.       lisinopril (PRINIVIL, ZESTRIL) 20 mg tablet Take  by mouth daily.  Ferrous Fumarate 325 mg (106 mg iron) tab Take  by mouth.  lidocaine 5 % topical cream Apply  to affected area two (2) times daily as needed for Itching.  buPROPion SR (WELLBUTRIN SR) 150 mg SR tablet Take  by mouth three (3) times daily.  simvastatin (ZOCOR) 80 mg tablet Take 80 mg by mouth nightly. No current facility-administered medications on file prior to visit. No Known Allergies    Objective: There were no vitals taken for this visit. There is no height or weight on file to calculate BMI. Assessment of cognitive impairment: Alert and oriented x 3  Depression Screen:   3 most recent PHQ Screens 2/11/2020   Little interest or pleasure in doing things Not at all   Feeling down, depressed, irritable, or hopeless Not at all   Total Score PHQ 2 0     Fall Risk Assessment:  No flowsheet data found. Functional Ability:   Does the patient exhibit a steady gait? yes   How long did it take the patient to get up and walk from a sitting position? 2 seconds   Is the patient self reliant?  (ie can do own laundry, meals, household chores)  yes   Does the patient handle his/her own medications? yes   Does the patient handle his/her own money? yes   Is the patients home safe (ie good lighting, handrails on stairs and bath, etc.)? yes   Did you notice or did patient express any hearing difficulties? Yes. Has tinnitus   Did you notice of did patient express any vision difficulties? Yes. Has glaucoma   Were distance and reading eye charts used? no     Advance Care Planning:   Patient was offered the opportunity to discuss advance care planning:  yes     Does patient have an Advance Directive:  No. Pt would like to appoint his estraned wife, Mariah Sharpe, as his medical decision maker in the event that he can no longer do so. Her phone number is [phone]. He doesn't have a secondary person at his time.  He has three children. Two girls are in college. If his death is imminent and medical treatment will not help him recover, pt does not want life prolonging measures. If his condition makes him unaware of himself or his surroundings and he cannot interact with others, pt does not want life prolonging measures. If no, did you provide information on Caring Connections?  no     Plan:    Orders Placed This Encounter    ADVANCE CARE PLANNING FIRST [facility]   Topic Date Due    Colorectal Cancer Screening Combo  2007    Medicare Yearly Exam  2020    Flu Vaccine (1) 2021 (Originally 2020)    DTaP/Tdap/Td series (1 - Tdap) 12/10/2021 (Originally 1978)    Pneumococcal 0-64 years (1 of 1 - PPSV23) 2021 (Originally 1961)    Shingrix Vaccine Age 50> (1 of 2) 2021 (Originally 2007)    A1C test (Diabetic or Prediabetic)  2021    Lipid Screen  2021    Hepatitis C Screening  Completed     Time spent counseling pt on advanced care plannin minutes    *Patient verbalized understanding and agreement with the plan. Follow-up and Dispositions    · Return in about 1 year (around 12/10/2021) for Medicare wellness. [place].  Ruel1 STEPHANE Rdz MD - Internal Medicine  12/10/2020, 1:07 PM  [facility]  [address] [place], [address]  Phone [phone]  Fax [phone]

## 2020-12-10 NOTE — PATIENT INSTRUCTIONS
Schedule of Personalized Health Plan (Provide Copy to Patient) The best way to stay healthy is to live a healthy lifestyle. A healthy lifestyle includes regular exercise, eating a well-balanced diet, keeping a healthy weight and not smoking. Regular physical exams and screening tests are another important way to take care of yourself. Preventive exams provided by health care providers can find health problems early when treatment works best and can keep you from getting certain diseases or illnesses. Preventive services include exams, lab tests, screenings, shots, monitoring and information to help you take care of your own health. All people over 65 should have a pneumonia shot. Pneumonia shots are usually only needed once in a lifetime unless your doctor decides differently. All people over 65 should have a yearly flu shot. People over 65 are at medium to high risk for Hepatitis B. Three shots are needed for complete protection. In addition to your physical exam, some screening tests are recommended: 
 
Bone mass measurement (dexa scan) is recommended every two years if you have certain risk factors, such as personal history of vertebral fracture or chronic steroid medication use Diabetes Mellitus screening is recommended every year. Glaucoma is an eye disease caused by high pressure in the eye. An eye exam is recommended every year. Cardiovascular screening tests that check your cholesterol and other blood fat (lipid) levels are recommended every five years. Colorectal Cancer screening tests help to find pre-cancerous polyps (growths in the colon) so they can be removed before they turn into cancer. Tests ordered for screening depend on your personal and family history risk factors. Screening for Prostate Cancer is recommended yearly with a digital rectal exam and/or a PSA test 
 
Here is a list of your current Health Maintenance items with a due date: 
Health Maintenance Topic Date Due  Pneumococcal 0-64 years (1 of 1 - PPSV23) [birth year]1963  DTaP/Tdap/Td series (1 - Tdap) 01/08/1978  Shingrix Vaccine Age 50> (1 of 2) 01/08/2007  Colorectal Cancer Screening Combo  01/08/2007  Medicare Yearly Exam  02/12/2020  Flu Vaccine (1) 09/01/2020  A1C test (Diabetic or Prediabetic)  02/12/2021  Lipid Screen  02/12/2021  Hepatitis C Screening  Completed

## 2020-12-10 NOTE — ACP (ADVANCE CARE PLANNING)
Pt would like to appoint his estraned wife, Coy Chavez, as his medical decision maker in the event that he can no longer do so. Her phone number is [phone]. He doesn't have a secondary person at his time. He has three children. Two girls are in college. If his death is imminent and medical treatment will not help him recover, pt does not want life prolonging measures. If his condition makes him unaware of himself or his surroundings and he cannot interact with others, pt does not want life prolonging measures.

## 2020-12-10 NOTE — PROGRESS NOTES
Lauren Pugh is a 63 y.o. male who was seen by synchronous (real-time) audio-video technology on 12/10/2020. Consent:  He and/or his healthcare decision maker is aware that this patient-initiated Telehealth encounter is a billable service, with coverage as determined by his insurance carrier. He is aware that he may receive a bill and has provided verbal consent to proceed: Yes    I was at home while conducting this encounter. Assessment & Plan:   Diagnoses and all orders for this visit:    1. Medicare annual wellness visit, initial    2. Essential hypertension    3. Controlled type 2 diabetes mellitus without complication, without long-term current use of insulin ([address]    4. Cigarette nicotine dependence without complication    5. Overweight (BMI 25.0-29.9)    6. Advanced care planning/counseling discussion  -     ADVANCE CARE PLANNING FIRST 27 MINS    Pt states that he will seek care at the [place] for his refills and for immunizations  Recommend smoking cessation  Time spent counseling pt on smoking cessation: 4 minutes    Follow-up and Dispositions    · Return in about 1 year (around 12/10/2021) for Medicare wellness. Subjective:   Lauren Pugh was seen for Diabetes; Annual Wellness Visit; Nicotine Dependence; and Hypertension    Smoker   This is a chronic problem, new to me. This is not at goal. Pt smokes 1/2 ppd. Pt is open to quitting.      Hypertension  This is a chronic problem. Current status is unknown. Pt takes metoprolol tartrate, diltiazem and lisinopril. Pt reports compliance with these medications.     Diabetes mellitus  This is a chronic problem. This is at goal. Pt is on metformin. Pt is compliant with this medication. 3 most recent PHQ Screens 2/11/2020   Little interest or pleasure in doing things Not at all   Feeling down, depressed, irritable, or hopeless Not at all   Total Score PHQ 2 0      Prior to Admission medications    Medication Sig Start Date End Date Taking? Authorizing Provider   metFORMIN (GLUCOPHAGE) 1,000 mg tablet Take 1,000 mg by mouth daily. Yes Provider, Historical   traMADol (ULTRAM) 50 mg tablet Take 50 mg by mouth three (3) times daily. Yes Provider, Historical   metoprolol tartrate (LOPRESSOR) 50 mg tablet Take  by mouth two (2) times a day. Yes Provider, Historical   dilTIAZem ER (CARDIZEM LA) 240 mg Tb24 tablet Take 240 mg by mouth daily. Yes Provider, Historical   docusate sodium (COLACE) 100 mg capsule Take 100 mg by mouth two (2) times a day. Yes Provider, Historical   acyclovir (ZOVIRAX) 200 mg capsule Take  by mouth daily. Yes Provider, Historical   atorvastatin (LIPITOR) 80 mg tablet Take 40 mg by mouth daily. Yes Provider, Historical   lisinopril (PRINIVIL, ZESTRIL) 20 mg tablet Take  by mouth daily. Yes Provider, Historical   Ferrous Fumarate 325 mg (106 mg iron) tab Take  by mouth. Yes Provider, Historical   lidocaine 5 % topical cream Apply  to affected area two (2) times daily as needed for Itching. Yes Provider, Historical   buPROPion SR (WELLBUTRIN SR) 150 mg SR tablet Take  by mouth three (3) times daily. Yes Provider, Historical   simvastatin (ZOCOR) 80 mg tablet Take 80 mg by mouth nightly. Yes Provider, Historical     No Known Allergies  ROS  Cardiac: no chest pain or palpitations  Respiratory: no shortness of breath or cough     PHYSICAL EXAMINATION:  [ INSTRUCTIONS:  \"[x]\" Indicates a positive item  \"[]\" Indicates a negative item  -- DELETE ALL ITEMS NOT EXAMINED]  Vital Signs: (As obtained by patient/caregiver at home)  There were no vitals taken for this visit.      Constitutional: [x] Appears well-developed and well-nourished [x] No apparent distress    Mental status: [x] Alert and awake  [x] Oriented to person/place/time [x] Able to follow commands   Eyes:   Sclera  [x]  Normal   Discharge [x]  None visible    HENT: [x] Normocephalic, atraumatic  Neurological:        [x] No Facial Asymmetry   [x] No gaze palsy  Psychiatric:       [x] Normal Affect []      [x] No Hallucinations     We discussed the expected course, resolution and complications of the diagnosis(es) in detail. Medication risks, benefits, costs, interactions, and alternatives were discussed as indicated. I advised him to contact the office if his condition worsens, changes or fails to improve as anticipated. He expressed understanding with the diagnosis(es) and plan. Pursuant to the emergency declaration under the [address] [place] [address] waiver authority and the [employer] Act, this Virtual  Visit was conducted, with patient's consent, to reduce the patient's risk of exposure to COVID-19 and provide continuity of care for an established patient. Services were provided through a video synchronous discussion virtually to substitute for in-person clinic visit.     Pablo Schuster MD  Internal Medicine  12/10/2020, 2:24 PM  [facility]  [address] [place], [address]  Phone [phone]  Fax [phone]

## 2020-12-10 NOTE — PROGRESS NOTES
Connor Mccartney is a 63 y.o.  male presents today for office visit for follow up. Pt would also like to discuss DM. 1. Have you been to the ER, urgent care clinic since your last visit? Hospitalized since your last visit? No    2. Have you seen or consulted any other health care providers outside of the [address] [place] [address] since your last visit? Include any pap smears or colon screening. No    Upcoming Appts  none    Health Maintenance reviewed     VORB: No orders of the defined types were placed in this encounter.   Robert Garcia, YEMIN